# Patient Record
Sex: FEMALE | Race: WHITE | NOT HISPANIC OR LATINO | Employment: FULL TIME | URBAN - METROPOLITAN AREA
[De-identification: names, ages, dates, MRNs, and addresses within clinical notes are randomized per-mention and may not be internally consistent; named-entity substitution may affect disease eponyms.]

---

## 2024-11-22 NOTE — PROGRESS NOTES
Lauren Lozada  1970        Subjective     Chief Complaint: Establish Care and Annual Exam      History of Present Illness:  Ms. Lauren Lozada is a 54 y.o. female who presents to clinic for annual/establish care.      Mammo: not utd, ordered  Pap: not utd  C-scope: not utd, ordered    History of Present Illness    CHIEF COMPLAINT:  Ms. Lozada presents today to establish care and for an annual exam.    RECENT ILLNESS:  She reports experiencing a recent illness a few days ago, characterized by coughing without fever. Her symptoms are improving.    BREAST CANCER HISTORY:  She has a history of right-sided breast cancer diagnosed in May 2021, treated with lumpectomy and radiation therapy. Her last mammogram in December 2021 showed no signs of cancer. She has not had a mammogram since moving from [place].    FAMILY HISTORY:  Her mother had breast cancer. She tested negative for genetic components related to breast cancer.    SOCIAL HISTORY:  She works as a , standing for 6-7 hour shifts. She experiences stiffness in her feet after work when sitting in the car. She attempts to stay hydrated during work but sometimes struggles due to busy shifts. She denies current tobacco use and reports minimal alcohol consumption. She discloses past cocaine use but denies any history of injection drug use.    SEXUAL AND REPRODUCTIVE HISTORY:  She is currently sexually active with a male partner without using contraception. She has never been pregnant.    DIET AND EXERCISE:  She does not exercise regularly. She usually receives a free meal at work and has access to nutrition during her shift.    PREVENTIVE CARE:  Her last colon cancer screening was approximately three years ago, which involved a mail-in stool sample test.      ROS:  Constitutional: -fevers  Respiratory: +cough  Musculoskeletal: +joint stiffness         PAST HISTORY:     History reviewed. No pertinent past medical history.    Past Surgical History:  "  Procedure Laterality Date    LUMPECTOMY, BREAST Right        Family History   Problem Relation Name Age of Onset    Breast cancer Mother         Social History     Socioeconomic History    Marital status:    Tobacco Use    Smoking status: Never    Smokeless tobacco: Never   Substance and Sexual Activity    Alcohol use: Not Currently    Drug use: Never    Sexual activity: Yes     Partners: Male     Social Drivers of Health     Financial Resource Strain: Low Risk  (11/25/2024)    Overall Financial Resource Strain (CARDIA)     Difficulty of Paying Living Expenses: Not very hard   Food Insecurity: No Food Insecurity (11/25/2024)    Hunger Vital Sign     Worried About Running Out of Food in the Last Year: Never true     Ran Out of Food in the Last Year: Never true   Physical Activity: Inactive (11/25/2024)    Exercise Vital Sign     Days of Exercise per Week: 0 days     Minutes of Exercise per Session: 0 min   Stress: Stress Concern Present (11/25/2024)    [employer] - Occupational Stress Questionnaire     Feeling of Stress : To some extent   Housing Stability: Unknown (11/25/2024)    Housing Stability Vital Sign     Unable to Pay for Housing in the Last Year: No       MEDICATIONS & ALLERGIES:     No current outpatient medications on file prior to visit.     No current facility-administered medications on file prior to visit.       Review of patient's allergies indicates:  No Known Allergies    OBJECTIVE:     Vital Signs:  Vitals:    11/25/24 0816   BP: 122/82   BP Location: Right arm   Patient Position: Sitting   Pulse: 73   Resp: 18   Temp: 98.7 °F (37.1 °C)   TempSrc: Oral   SpO2: 98%   Weight: 90.1 kg (198 lb 10.2 oz)   Height: 5' 8" (1.727 m)       Body mass index is 30.2 kg/m².     Physical Exam:  Physical Exam  Vitals and nursing note reviewed.   Constitutional:       General: She is not in acute distress.     Appearance: She is obese. She is not ill-appearing.   HENT:      " "Head: Normocephalic and atraumatic.      Mouth/Throat:      Mouth: Mucous membranes are moist.      Pharynx: Oropharynx is clear. No oropharyngeal exudate or posterior oropharyngeal erythema.   Eyes:      Extraocular Movements: Extraocular movements intact.      Conjunctiva/sclera: Conjunctivae normal.   Cardiovascular:      Rate and Rhythm: Normal rate and regular rhythm.   Pulmonary:      Effort: Pulmonary effort is normal. No respiratory distress.      Breath sounds: Normal breath sounds. No wheezing or rales.   Chest:      Chest wall: No tenderness.   Abdominal:      Palpations: Abdomen is soft.      Tenderness: There is no abdominal tenderness. There is no guarding.   Musculoskeletal:         General: Normal range of motion.      Cervical back: Normal range of motion and neck supple. No tenderness.      Right lower leg: No edema.      Left lower leg: No edema.   Lymphadenopathy:      Cervical: No cervical adenopathy.   Skin:     General: Skin is warm and dry.   Neurological:      Mental Status: She is alert and oriented to person, place, and time.            Laboratory  No results found for: "WBC", "HGB", "HCT", "MCV", "PLT"  No results found for: "GLU", "NA", "K", "CL", "CO2", "BUN", "CREATININE", "CALCIUM", "MG"  No results found for: "INR", "PROTIME"  No results found for: "HGBA1C"  No results for input(s): "POCTGLUCOSE" in the last 72 hours.      Health Maintenance         Date Due Completion Date    Hepatitis C Screening Never done ---    Cervical Cancer Screening Never done ---    Lipid Panel Never done ---    COVID-19 Vaccine (1) Never done ---    Pneumococcal Vaccines (Age 0-64) (1 of 2 - PCV) Never done ---    HIV Screening Never done ---    TETANUS VACCINE Never done ---    Mammogram Never done ---    Shingles Vaccine (1 of 2) Never done ---    Hemoglobin A1c (Diabetic Prevention Screening) Never done ---    Colorectal Cancer Screening Never done ---    Influenza Vaccine (1) Never done ---    RSV " Vaccine (Age 60+ and Pregnant patients) (1 - 1-dose 75+ series) 11/24/2047 ---            ASSESSMENT & PLAN:   54 y.o. female who was seen today in clinic for annual/establishing care.    Annual physical exam  -     Ambulatory referral/consult to Obstetrics / Gynecology; Future; Expected date: 12/02/2024  -     Hemoglobin A1C; Future; Expected date: 11/25/2024  -     CBC Without Differential; Future; Expected date: 11/25/2024  -     Comprehensive Metabolic Panel; Future; Expected date: 11/25/2024  -     TSH; Future; Expected date: 11/25/2024  -     Lipid Panel; Future; Expected date: 11/25/2024  -     Hepatitis C Antibody; Future; Expected date: 11/25/2024  -     HIV 1/2 Ag/Ab (4th Gen); Future; Expected date: 11/25/2024  -     Treponema Pallidium Antibodies IgG, IgM; Future; Expected date: 11/25/2024  -     Urinalysis; Future  -     C. trachomatis/N. gonorrhoeae by AMP DNA [facility]; Urine; Future; Expected date: 11/25/2024    Malignant neoplasm of right female breast, unspecified estrogen receptor status, unspecified site of breast    History of lumpectomy    Encounter for screening colonoscopy  -     Cologuard Screening (Multitarget Stool DNA); Future; Expected date: 11/25/2024    Encounter for screening mammogram for breast cancer  -     Mammo Digital Screening Bilat w/ Lasha; Future; Expected date: 11/25/2024    Class 1 obesity with body mass index (BMI) of 30.0 to 30.9 in adult, unspecified obesity type, unspecified whether serious comorbidity present         1. Annual physical exam    2. Malignant neoplasm of right female breast, unspecified estrogen receptor status, unspecified site of breast    3. History of lumpectomy    4. Encounter for screening colonoscopy    5. Encounter for screening mammogram for breast cancer    6. Class 1 obesity with body mass index (BMI) of 30.0 to 30.9 in adult, unspecified obesity type, unspecified whether serious comorbidity present      1/4. Fasting labs ordered.  Declining flu  vaccine today.  Discussed importance of other vaccines including Tetanus and shingles.  Patient would like to think about them before getting.  OBGYN referral sent for well woman exams.  Cologuard ordered.    2/3/5.  S/p lumpectomy and radiation in 2021, stable however has not had recent mammogram to evaluated.  Ordered today.    6.  Discussed importance of diet and exercise.      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    GYNECOLOGICAL EXAMINATION:  - Referred to OB/GYN for well-woman exam, Pap smear, and breast cancer follow-up.    EXERCISE:  - Discussed benefits of gradual increase in exercise regimen.  - Ms. Lozada to gradually increase exercise to 30 minutes of cardiovascular activity 5 days a week.    NUTRITION AND HYDRATION:  - Explained importance of staying hydrated and maintaining electrolyte balance during work shifts to prevent cramping.  - Educated on importance of balanced diet, emphasizing lean meats, fruits, and vegetables.  - Recommend improving hydration during work shifts, including electrolyte-rich beverages like Gatorade or Pedialyte.  - Ms. Lozada to consume regular meals or snacks during work shifts.  - Recommend focusing on a balanced diet with lean meats, fruits, and vegetables.    FOLLOW-UP:  - Follow up in 1 year for annual check-up.  - Contact the office sooner if any  concerning lab results.             RTC in 1 year or sooner if needed    Vincent Garrison MD  [facility]    This note was generated with the assistance of ambient listening technology. Verbal consent was obtained by the patient and accompanying visitor(s) for the recording of patient appointment to facilitate this note. I attest to having reviewed and edited the generated note for accuracy, though some syntax or spelling errors may persist. Please contact the author of this note for any clarification.

## 2024-11-25 ENCOUNTER — OFFICE VISIT (OUTPATIENT)
Dept: INTERNAL MEDICINE | Facility: CLINIC | Age: 54
End: 2024-11-25
Payer: COMMERCIAL

## 2024-11-25 ENCOUNTER — HOSPITAL ENCOUNTER (OUTPATIENT)
Dept: RADIOLOGY | Facility: HOSPITAL | Age: 54
Discharge: HOME | End: 2024-11-25
Payer: COMMERCIAL

## 2024-11-25 VITALS
HEIGHT: 68 IN | SYSTOLIC BLOOD PRESSURE: 122 MMHG | RESPIRATION RATE: 18 BRPM | DIASTOLIC BLOOD PRESSURE: 82 MMHG | HEART RATE: 73 BPM | OXYGEN SATURATION: 98 % | TEMPERATURE: 99 F | BODY MASS INDEX: 30.1 KG/M2 | WEIGHT: 198.63 LBS

## 2024-11-25 VITALS — BODY MASS INDEX: 30.01 KG/M2 | HEIGHT: 68 IN | WEIGHT: 198 LBS

## 2024-11-25 DIAGNOSIS — Z12.11 ENCOUNTER FOR SCREENING COLONOSCOPY: ICD-10-CM

## 2024-11-25 DIAGNOSIS — E66.811 CLASS 1 OBESITY WITH BODY MASS INDEX (BMI) OF 30.0 TO 30.9 IN ADULT, UNSPECIFIED OBESITY TYPE, UNSPECIFIED WHETHER SERIOUS COMORBIDITY PRESENT: ICD-10-CM

## 2024-11-25 DIAGNOSIS — C50.911 MALIGNANT NEOPLASM OF RIGHT FEMALE BREAST, UNSPECIFIED ESTROGEN RECEPTOR STATUS, UNSPECIFIED SITE OF BREAST: ICD-10-CM

## 2024-11-25 DIAGNOSIS — Z12.31 ENCOUNTER FOR SCREENING MAMMOGRAM FOR BREAST CANCER: ICD-10-CM

## 2024-11-25 DIAGNOSIS — Z00.00 ANNUAL PHYSICAL EXAM: Primary | ICD-10-CM

## 2024-11-25 DIAGNOSIS — Z98.890 HISTORY OF LUMPECTOMY: ICD-10-CM

## 2024-11-25 PROCEDURE — 77067 SCR MAMMO BI INCL CAD: CPT | Mod: TC,PO

## 2024-11-25 PROCEDURE — 1159F MED LIST DOCD IN RCRD: CPT | Mod: CPTII,S$GLB,,

## 2024-11-25 PROCEDURE — 3079F DIAST BP 80-89 MM HG: CPT | Mod: CPTII,S$GLB,,

## 2024-11-25 PROCEDURE — 99999 PR PBB SHADOW E&M-NEW PATIENT-LVL IV: CPT | Mod: PBBFAC,,,

## 2024-11-25 PROCEDURE — 99386 PREV VISIT NEW AGE 40-64: CPT | Mod: S$GLB,,,

## 2024-11-25 PROCEDURE — 3074F SYST BP LT 130 MM HG: CPT | Mod: CPTII,S$GLB,,

## 2024-11-25 PROCEDURE — 1160F RVW MEDS BY RX/DR IN RCRD: CPT | Mod: CPTII,S$GLB,,

## 2024-11-25 PROCEDURE — 3008F BODY MASS INDEX DOCD: CPT | Mod: CPTII,S$GLB,,

## 2024-12-03 ENCOUNTER — OFFICE VISIT (OUTPATIENT)
Dept: OBSTETRICS AND GYNECOLOGY | Facility: CLINIC | Age: 54
End: 2024-12-03
Payer: COMMERCIAL

## 2024-12-03 VITALS
HEIGHT: 68 IN | BODY MASS INDEX: 30.58 KG/M2 | SYSTOLIC BLOOD PRESSURE: 100 MMHG | DIASTOLIC BLOOD PRESSURE: 76 MMHG | WEIGHT: 201.75 LBS

## 2024-12-03 DIAGNOSIS — Z01.419 ENCOUNTER FOR WELL WOMAN EXAM: Primary | ICD-10-CM

## 2024-12-03 DIAGNOSIS — Z12.4 PAP SMEAR FOR CERVICAL CANCER SCREENING: ICD-10-CM

## 2024-12-03 DIAGNOSIS — N76.0 ACUTE VAGINITIS: ICD-10-CM

## 2024-12-03 DIAGNOSIS — Z00.00 ANNUAL PHYSICAL EXAM: ICD-10-CM

## 2024-12-03 DIAGNOSIS — Z12.31 BREAST CANCER SCREENING BY MAMMOGRAM: ICD-10-CM

## 2024-12-03 PROCEDURE — 99999 PR PBB SHADOW E&M-EST. PATIENT-LVL III: CPT | Mod: PBBFAC,,, | Performed by: FAMILY MEDICINE

## 2024-12-03 PROCEDURE — 3078F DIAST BP <80 MM HG: CPT | Mod: CPTII,S$GLB,, | Performed by: FAMILY MEDICINE

## 2024-12-03 PROCEDURE — 1160F RVW MEDS BY RX/DR IN RCRD: CPT | Mod: CPTII,S$GLB,, | Performed by: FAMILY MEDICINE

## 2024-12-03 PROCEDURE — 1159F MED LIST DOCD IN RCRD: CPT | Mod: CPTII,S$GLB,, | Performed by: FAMILY MEDICINE

## 2024-12-03 PROCEDURE — 3044F HG A1C LEVEL LT 7.0%: CPT | Mod: CPTII,S$GLB,, | Performed by: FAMILY MEDICINE

## 2024-12-03 PROCEDURE — 0352U VAGINOSIS SCREEN BY DNA PROBE: CPT | Performed by: FAMILY MEDICINE

## 2024-12-03 PROCEDURE — 88175 CYTOPATH C/V AUTO FLUID REDO: CPT | Performed by: FAMILY MEDICINE

## 2024-12-03 PROCEDURE — 99386 PREV VISIT NEW AGE 40-64: CPT | Mod: S$GLB,,, | Performed by: FAMILY MEDICINE

## 2024-12-03 PROCEDURE — 3008F BODY MASS INDEX DOCD: CPT | Mod: CPTII,S$GLB,, | Performed by: FAMILY MEDICINE

## 2024-12-03 PROCEDURE — 3074F SYST BP LT 130 MM HG: CPT | Mod: CPTII,S$GLB,, | Performed by: FAMILY MEDICINE

## 2024-12-03 PROCEDURE — 87624 HPV HI-RISK TYP POOLED RSLT: CPT | Performed by: FAMILY MEDICINE

## 2024-12-03 NOTE — PROGRESS NOTES
HISTORY OF PRESENT ILLNESS:    Lauren Lozada is a 54 y.o. female, , Patient's last menstrual period was 2024 (approximate).,  presents for a routine annual exam .   Last pap:  4 yrs ago NL (LEEP around )   Last mammogram: 2024 no report yet  Last colon ca screening:  cologuard ordered placed by PCP   SA: male partner, does not use condoms- discussed potential for pregnancy if reg cycles although unlikely. Can consider using condoms  Contraception: none.    Sexually transmitted infection risk: very low risk of STD exposure.   Menstrual flow: regular every 28-30 days, sometimes mild night sweats no other menopausal sx.  -vaginal odor for 1 yr, sometimes itching. No uti sx. No pelvic pain or breast sx. Hx of breast CA tx in [place] before moving here. Offered referral to est with breast ctr, she will let me know her desires  This is the extent of the patient's complaints at this time.     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    Review of patient's allergies indicates:  No Known Allergies    Family History   Problem Relation Name Age of Onset    Breast cancer Mother  70    Cancer Brother      Colon cancer Neg Hx      Ovarian cancer Neg Hx         Social History     Socioeconomic History    Marital status:    Tobacco Use    Smoking status: Never    Smokeless tobacco: Never   Substance and Sexual Activity    Alcohol use: Not Currently    Drug use: Never    Sexual activity: Yes     Partners: Male     Social Drivers of Health     Financial Resource Strain: Low Risk  (2024)    Overall Financial Resource Strain (CARDIA)     Difficulty of Paying Living  "Expenses: Not very hard   Food Insecurity: No Food Insecurity (2024)    Hunger Vital Sign     Worried About Running Out of Food in the Last Year: Never true     Ran Out of Food in the Last Year: Never true   Physical Activity: Inactive (2024)    Exercise Vital Sign     Days of Exercise per Week: 0 days     Minutes of Exercise per Session: 0 min   Stress: Stress Concern Present (2024)    [employer] - Occupational Stress Questionnaire     Feeling of Stress : To some extent   Housing Stability: Unknown (2024)    Housing Stability Vital Sign     Unable to Pay for Housing in the Last Year: No       OB History    Para Term  AB Living   1 0 0   1     SAB IAB Ectopic Multiple Live Births                  # Outcome Date GA Lbr Sundeep/2nd Weight Sex Type Anes PTL Lv   1 AB                   COMPREHENSIVE GYN HISTORY:  PAP History: + abnormal Paps.  Infection History: Denies STDs. Denies PID.  Benign History: Denies uterine fibroids. Denies ovarian cysts. Denies endometriosis. Denies other conditions.  Cancer History: Denies cervical cancer. Denies uterine cancer or hyperplasia. Denies ovarian cancer. Denies vulvar cancer or pre-cancer. Denies vaginal cancer or pre-cancer. + breast cancer. Denies colon cancer.      ROS:  GENERAL: No weight changes. No swelling. No fatigue. No fever.  BREASTS: No pain. No lumps. No discharge.  ABDOMEN: No pain. No nausea. No vomiting. No diarrhea. No constipation.  REPRODUCTIVE: No abnormal bleeding. No pelvic pain.   VULVA: No pain. No lesions. No itching.  VAGINA: No relaxation. No itching. + odor. No discharge. No lesions.  URINARY: No incontinence. No nocturia. No frequency. No dysuria.    /76 (Patient Position: Sitting)   Ht 5' 8" (1.727 m)   Wt 91.5 kg (201 lb 11.5 oz)   LMP 2024 (Approximate)   BMI 30.67 kg/m²     PE:  Physical Exam:   Constitutional: She is oriented to person, place, and time. She appears " well-developed and well-nourished. No distress.      Neck: No thyroid mass and no thyromegaly present.     Pulmonary/Chest: Right breast exhibits no inverted nipple, no mass, no nipple discharge, no skin change, no tenderness and no bleeding. Left breast exhibits no inverted nipple, no mass, no nipple discharge, no skin change, no tenderness and no bleeding.        Abdominal: Soft. There is no abdominal tenderness.     Genitourinary:    Uterus, right adnexa and left adnexa normal.      Pelvic exam was performed with patient in the lithotomy position.   The external female genitalia was normal.   Genitalia hair distrobution normal .   There is no rash or lesion on the right labia. There is no rash or lesion on the left labia. Cervix is normal. Right adnexum displays no mass, no tenderness and no fullness. Left adnexum displays no mass, no tenderness and no fullness. There is vaginal discharge (milky scant) in the vagina. No tenderness, bleeding, rectocele, cystocele or prolapse of vaginal walls in the vagina.    No foreign body in the vagina.   Cervix exhibits no motion tenderness, no lesion, no discharge, no friability, no tenderness and no polyp.    pap smear completedUterus is not enlarged and not tender. Normal urethral meatus.              Neurological: She is alert and oriented to person, place, and time.          PROCEDURES/ORDERS:  Pap      Assessment/Plan:    Encounter for well woman exam    Pap smear for cervical cancer screening  -     Liquid-Based Pap Smear, Screening  -     HPV High Risk Genotypes, PCR    Breast cancer screening by mammogram  -     Mammo Digital Screening Bilat w/ Lasha; Future; Expected date: 11/26/2025    Acute vaginitis  -     Vaginosis Screen by DNA Probe    Discussed unsure of insurance coverage with vaginosis swab. Pt would still like to do.   Prefers PO if flagyl needed    COUNSELING:  The patient was counseled today on:  -A.C.S. Pap and pelvic exam guidelines, recomendations for  yearly mammogram, monthly self breast exams and to follow up with her PCP for other health maintenance.    FOLLOW-UP  annually for WWE.

## 2024-12-04 LAB
CLINICAL INFO: NORMAL
DATE OF PREVIOUS PAP: NO
DATE PREVIOUS BX: YES
LMP START DATE: NORMAL
SPECIMEN SOURCE CVX/VAG CYTO: NORMAL

## 2024-12-09 DIAGNOSIS — B96.89 BACTERIAL VAGINOSIS: Primary | ICD-10-CM

## 2024-12-09 DIAGNOSIS — N76.0 BACTERIAL VAGINOSIS: Primary | ICD-10-CM

## 2024-12-09 RX ORDER — METRONIDAZOLE 500 MG/1
500 TABLET ORAL EVERY 12 HOURS
Qty: 14 TABLET | Refills: 0 | Status: SHIPPED | OUTPATIENT
Start: 2024-12-09 | End: 2024-12-16

## 2024-12-16 ENCOUNTER — PATIENT MESSAGE (OUTPATIENT)
Dept: OBSTETRICS AND GYNECOLOGY | Facility: CLINIC | Age: 54
End: 2024-12-16
Payer: COMMERCIAL

## 2024-12-16 DIAGNOSIS — Z12.31 BREAST CANCER SCREENING BY MAMMOGRAM: Primary | ICD-10-CM

## 2024-12-18 ENCOUNTER — PROCEDURE VISIT (OUTPATIENT)
Dept: OBSTETRICS AND GYNECOLOGY | Facility: CLINIC | Age: 54
End: 2024-12-18
Attending: OBSTETRICS & GYNECOLOGY
Payer: COMMERCIAL

## 2024-12-18 DIAGNOSIS — R87.612 LGSIL ON PAP SMEAR OF CERVIX: Primary | ICD-10-CM

## 2024-12-18 PROCEDURE — 99213 OFFICE O/P EST LOW 20 MIN: CPT | Mod: S$GLB,,, | Performed by: OBSTETRICS & GYNECOLOGY

## 2024-12-18 NOTE — PROGRESS NOTES
SUBJECTIVE:   54 y.o. female   for LGSIL PAP. Patient's last menstrual period was 2024 (approximate)..  Had WWE 12-3-24 and PAP was LGSIL. Has been  for 3 years. Has h/o abnormal PAP . Had cryo and LEEP.  Has had normal PAPs since that time until now.         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     Past Surgical History:   Procedure Laterality Date    BREAST LUMPECTOMY      CERVICAL BIOPSY  W/ LOOP ELECTRODE EXCISION N/A     CRYOTHERAPY  2009    LUMPECTOMY, BREAST Right     2021     Social History     Socioeconomic History    Marital status:    Tobacco Use    Smoking status: Never    Smokeless tobacco: Never   Substance and Sexual Activity    Alcohol use: Not Currently    Drug use: Never    Sexual activity: Yes     Partners: Male     Social Drivers of Health     Financial Resource Strain: Low Risk  (2024)    Overall Financial Resource Strain (CARDIA)     Difficulty of Paying Living Expenses: Not very hard   Food Insecurity: No Food Insecurity (2024)    Hunger Vital Sign     Worried About Running Out of Food in the Last Year: Never true     Ran Out of Food in the Last Year: Never true   Physical Activity: Inactive (2024)    Exercise Vital Sign     Days of Exercise per Week: 0 days     Minutes of Exercise per Session: 0 min   Stress: Stress Concern Present (2024)    [employer] - Occupational Stress Questionnaire     Feeling of Stress : To some extent   Housing Stability: Unknown (2024)    Housing Stability Vital Sign     Unable to Pay for Housing in the Last Year: No     Family History   Problem Relation Name Age of Onset    Breast cancer Mother  70    Cancer Brother      Colon cancer Neg Hx      Ovarian cancer Neg Hx       OB History    Para Term  AB Living   1 0 0   1     SAB IAB Ectopic Multiple Live  Births                  # Outcome Date GA Lbr Sundeep/2nd Weight Sex Type Anes PTL Lv   1 AB                  No current outpatient medications on file.     No current facility-administered medications for this visit.     Allergies: Patient has no known allergies.     ROS:  Constitutional: no weight loss, weight gain, fever, fatigue  Eyes:  No vision changes, glasses/contacts  ENT/Mouth: No ulcers, sinus problems, ears ringing, headache  Cardiovascular: No inability to lie flat, chest pain, exercise intolerance, swelling, heart palpitations  Respiratory: No wheezing, coughing blood, shortness of breath, or cough  Gastrointestinal: No diarrhea, bloody stool, nausea/vomiting, constipation, gas, hemorrhoids  Genitourinary: No blood in urine, painful urination, urgency of urination, frequency of urination, incomplete emptying, incontinence, abnormal bleeding, painful periods, heavy periods, vaginal discharge, vaginal odor, painful intercourse, sexual problems, bleeding after intercourse.  Musculoskeletal: No muscle weakness  Skin/Breast: No painful breasts, nipple discharge, masses, rash, ulcers  Neurological: No passing out, seizures, numbness, headache  Endocrine: No diabetes, hypothyroid, hyperthyroid, hot flashes, hair loss, abnormal hair growth, ance  Psychiatric: No depression, crying  Hematologic: No bruises, bleeding, swollen lymph nodes, anemia.      OBJECTIVE:   The patient appears well, alert, oriented x 3, in no distress.  LMP 11/21/2024 (Approximate)     ASSESSMENT:   1. LGSIL on Pap smear of cervix            PLAN:      Discussed LGSIL PAP, h/o abnormal PAP, need for colposcopy  Return to clinic when period ends

## 2024-12-30 ENCOUNTER — PROCEDURE VISIT (OUTPATIENT)
Dept: OBSTETRICS AND GYNECOLOGY | Facility: CLINIC | Age: 54
End: 2024-12-30
Payer: COMMERCIAL

## 2024-12-30 VITALS
SYSTOLIC BLOOD PRESSURE: 100 MMHG | DIASTOLIC BLOOD PRESSURE: 60 MMHG | BODY MASS INDEX: 30.17 KG/M2 | HEIGHT: 68 IN | WEIGHT: 199.06 LBS

## 2024-12-30 DIAGNOSIS — R87.612 LGSIL ON PAP SMEAR OF CERVIX: Primary | ICD-10-CM

## 2024-12-30 DIAGNOSIS — Z01.812 PRE-PROCEDURE LAB EXAM: ICD-10-CM

## 2024-12-30 LAB
B-HCG UR QL: NEGATIVE
CTP QC/QA: YES

## 2024-12-30 NOTE — PROCEDURES
Colposcopy    Date/Time: 12/30/2024 9:00 AM    Performed by: Kady Nash MD  Authorized by: Kady Nash MD    Assistants?: Yes      Colposcopy Site:  Cervix  Position:  Supine  Acrowhite Lesion: No    Atypical Vessels: No    Transformation Zone Adequate?: No    Biopsy?: No    ECC Performed?: Yes    LEEP Performed?: No     Patient tolerated the procedure well with no immediate complications.   Post-operative instructions were provided for the patient.   Patient was discharged and will follow up if any complications occur    Discussed h/o LEEP, cryo and inadequate TZ.  ECC done. Awaiting results  Plan f/u 6 months vs excisional procedure

## 2025-03-17 ENCOUNTER — PATIENT MESSAGE (OUTPATIENT)
Dept: INTERNAL MEDICINE | Facility: CLINIC | Age: 55
End: 2025-03-17